# Patient Record
Sex: FEMALE | Race: WHITE | NOT HISPANIC OR LATINO
[De-identification: names, ages, dates, MRNs, and addresses within clinical notes are randomized per-mention and may not be internally consistent; named-entity substitution may affect disease eponyms.]

---

## 2017-02-28 ENCOUNTER — HOSPITAL (OUTPATIENT)
Dept: OTHER | Age: 27
End: 2017-02-28
Attending: INTERNAL MEDICINE

## 2017-02-28 LAB
CONTROL LINE: PRESENT
CONTROL LINE: PRESENT
INFLU A POC: NORMAL
INFLU B POC: NORMAL
Lab: CLEAR
Lab: CLEAR
STREP POC: NEGATIVE

## 2018-05-28 ENCOUNTER — HOSPITAL (OUTPATIENT)
Dept: OTHER | Age: 28
End: 2018-05-28
Attending: FAMILY MEDICINE

## 2021-07-22 ENCOUNTER — HOSPITAL ENCOUNTER (OUTPATIENT)
Dept: GENERAL RADIOLOGY | Facility: HOSPITAL | Age: 31
Discharge: HOME | End: 2021-07-22
Attending: INTERNAL MEDICINE
Payer: COMMERCIAL

## 2021-07-22 DIAGNOSIS — R00.0 RACING HEART BEAT: ICD-10-CM

## 2021-07-22 DIAGNOSIS — Z86.19 HISTORY OF BLASTOMYCOSIS: ICD-10-CM

## 2021-07-22 PROCEDURE — 71046 X-RAY EXAM CHEST 2 VIEWS: CPT | Performed by: INTERNAL MEDICINE

## 2022-01-04 ENCOUNTER — HOSPITAL ENCOUNTER (EMERGENCY)
Facility: HOSPITAL | Age: 32
Discharge: HOME | End: 2022-01-04
Attending: EMERGENCY MEDICINE
Payer: COMMERCIAL

## 2022-01-04 ENCOUNTER — APPOINTMENT (OUTPATIENT)
Dept: GENERAL RADIOLOGY | Facility: HOSPITAL | Age: 32
End: 2022-01-04
Attending: PHYSICIAN ASSISTANT
Payer: COMMERCIAL

## 2022-01-04 VITALS
RESPIRATION RATE: 16 BRPM | TEMPERATURE: 98 F | OXYGEN SATURATION: 98 % | DIASTOLIC BLOOD PRESSURE: 90 MMHG | SYSTOLIC BLOOD PRESSURE: 131 MMHG | HEART RATE: 76 BPM

## 2022-01-04 DIAGNOSIS — U07.1 COVID-19: Primary | ICD-10-CM

## 2022-01-04 PROCEDURE — 71045 X-RAY EXAM CHEST 1 VIEW: CPT | Performed by: PHYSICIAN ASSISTANT

## 2022-01-04 PROCEDURE — 99283 EMERGENCY DEPT VISIT LOW MDM: CPT

## 2022-01-04 NOTE — ED INITIAL ASSESSMENT (HPI)
Pt to ER for a positive Covid-19 test on Sunday 1/2/22. Pt states she is having SOB, cough and congestion. Per patient she called her PCP who instructed her to come to ER due to medical hx.     Pt. Presents to ER a&ox4 with complaints of generalized fatigue

## 2022-01-04 NOTE — ED PROVIDER NOTES
Patient Seen in: [facility] Emergency Department      History   Patient presents with:  Covid    Stated Complaint: sore throat, cough, congestion- covid     Subjective:   DIONNA Vaca is a pleasant 42-year-old female who comes in today after testin distress, appropriate for age   Head: Normocephalic, without obvious abnormality   Eyes:  conjunctiva and cornea clear, both eyes     Throat: no trismus or drooling no phonation changes, patient handling secretions well   Lungs: Clear to auscultation bilat breath. close follow up with PCP. Patient verbalizes understanding of above and was discharges with stable vital signs. I also discussed with the patient that she not return to exercise or sports until cleared by the primary care physician.   I have advi